# Patient Record
Sex: MALE | Race: WHITE | Employment: FULL TIME
[De-identification: names, ages, dates, MRNs, and addresses within clinical notes are randomized per-mention and may not be internally consistent; named-entity substitution may affect disease eponyms.]

---

## 2023-12-03 ENCOUNTER — APPOINTMENT (OUTPATIENT)
Facility: HOSPITAL | Age: 28
End: 2023-12-03
Payer: COMMERCIAL

## 2023-12-03 ENCOUNTER — HOSPITAL ENCOUNTER (EMERGENCY)
Facility: HOSPITAL | Age: 28
Discharge: HOME | End: 2023-12-03
Attending: EMERGENCY MEDICINE
Payer: COMMERCIAL

## 2023-12-03 VITALS
DIASTOLIC BLOOD PRESSURE: 90 MMHG | HEART RATE: 97 BPM | TEMPERATURE: 98.6 F | OXYGEN SATURATION: 98 % | HEIGHT: 65 IN | SYSTOLIC BLOOD PRESSURE: 119 MMHG | WEIGHT: 160 LBS | RESPIRATION RATE: 16 BRPM | BODY MASS INDEX: 26.66 KG/M2

## 2023-12-03 DIAGNOSIS — R05.1 ACUTE COUGH: ICD-10-CM

## 2023-12-03 DIAGNOSIS — J06.9 ACUTE UPPER RESPIRATORY INFECTION: Primary | ICD-10-CM

## 2023-12-03 LAB
ALBUMIN SERPL-MCNC: 4.3 G/DL (ref 3.4–5)
ALBUMIN/GLOB SERPL: 1.3 (ref 0.8–1.7)
ALP SERPL-CCNC: 79 U/L (ref 45–117)
ALT SERPL-CCNC: 32 U/L (ref 16–61)
ANION GAP SERPL CALC-SCNC: 6 MMOL/L (ref 3–18)
AST SERPL-CCNC: 14 U/L (ref 10–38)
BASOPHILS # BLD: 0.1 K/UL (ref 0–0.1)
BASOPHILS NFR BLD: 0 % (ref 0–2)
BILIRUB SERPL-MCNC: 0.6 MG/DL (ref 0.2–1)
BUN SERPL-MCNC: 15 MG/DL (ref 7–18)
BUN/CREAT SERPL: 13 (ref 12–20)
CALCIUM SERPL-MCNC: 9.1 MG/DL (ref 8.5–10.1)
CHLORIDE SERPL-SCNC: 107 MMOL/L (ref 100–111)
CO2 SERPL-SCNC: 28 MMOL/L (ref 21–32)
CREAT SERPL-MCNC: 1.15 MG/DL (ref 0.6–1.3)
D DIMER PPP FEU-MCNC: 0.27 UG/ML(FEU)
DIFFERENTIAL METHOD BLD: ABNORMAL
EOSINOPHIL # BLD: 0 K/UL (ref 0–0.4)
EOSINOPHIL NFR BLD: 0 % (ref 0–5)
ERYTHROCYTE [DISTWIDTH] IN BLOOD BY AUTOMATED COUNT: 13.2 % (ref 11.6–14.5)
FLUAV AG NPH QL IA: NEGATIVE
FLUBV AG NOSE QL IA: NEGATIVE
GLOBULIN SER CALC-MCNC: 3.2 G/DL (ref 2–4)
GLUCOSE SERPL-MCNC: 122 MG/DL (ref 74–99)
HCT VFR BLD AUTO: 48 % (ref 36–48)
HGB BLD-MCNC: 16.6 G/DL (ref 13–16)
IMM GRANULOCYTES # BLD AUTO: 0.1 K/UL (ref 0–0.04)
IMM GRANULOCYTES NFR BLD AUTO: 1 % (ref 0–0.5)
LYMPHOCYTES # BLD: 0.9 K/UL (ref 0.9–3.6)
LYMPHOCYTES NFR BLD: 6 % (ref 21–52)
MCH RBC QN AUTO: 29.9 PG (ref 24–34)
MCHC RBC AUTO-ENTMCNC: 34.6 G/DL (ref 31–37)
MCV RBC AUTO: 86.3 FL (ref 78–100)
MONOCYTES # BLD: 0.4 K/UL (ref 0.05–1.2)
MONOCYTES NFR BLD: 2 % (ref 3–10)
NEUTS SEG # BLD: 14.7 K/UL (ref 1.8–8)
NEUTS SEG NFR BLD: 91 % (ref 40–73)
NRBC # BLD: 0 K/UL (ref 0–0.01)
NRBC BLD-RTO: 0 PER 100 WBC
PLATELET # BLD AUTO: 400 K/UL (ref 135–420)
PMV BLD AUTO: 9.7 FL (ref 9.2–11.8)
POTASSIUM SERPL-SCNC: 3.8 MMOL/L (ref 3.5–5.5)
PROT SERPL-MCNC: 7.5 G/DL (ref 6.4–8.2)
RBC # BLD AUTO: 5.56 M/UL (ref 4.35–5.65)
SODIUM SERPL-SCNC: 141 MMOL/L (ref 136–145)
WBC # BLD AUTO: 16.1 K/UL (ref 4.6–13.2)

## 2023-12-03 PROCEDURE — 71046 X-RAY EXAM CHEST 2 VIEWS: CPT

## 2023-12-03 PROCEDURE — 96360 HYDRATION IV INFUSION INIT: CPT

## 2023-12-03 PROCEDURE — 80053 COMPREHEN METABOLIC PANEL: CPT

## 2023-12-03 PROCEDURE — 99284 EMERGENCY DEPT VISIT MOD MDM: CPT

## 2023-12-03 PROCEDURE — 2580000003 HC RX 258: Performed by: EMERGENCY MEDICINE

## 2023-12-03 PROCEDURE — 85379 FIBRIN DEGRADATION QUANT: CPT

## 2023-12-03 PROCEDURE — 87804 INFLUENZA ASSAY W/OPTIC: CPT

## 2023-12-03 PROCEDURE — 85025 COMPLETE CBC W/AUTO DIFF WBC: CPT

## 2023-12-03 RX ORDER — 0.9 % SODIUM CHLORIDE 0.9 %
1000 INTRAVENOUS SOLUTION INTRAVENOUS ONCE
Status: COMPLETED | OUTPATIENT
Start: 2023-12-03 | End: 2023-12-03

## 2023-12-03 RX ADMIN — SODIUM CHLORIDE 1000 ML: 9 INJECTION, SOLUTION INTRAVENOUS at 16:35

## 2023-12-03 NOTE — ED NOTES
Medication given per [facility] order. Education regarding medication provided. Tolerated well with no complaints. Instructed patient to utilize the call light if he/she needs anything further. Will continue to monitor.        Jey Santos RN  12/03/23 [id number]

## 2023-12-03 NOTE — ED PROVIDER NOTES
[facility] EMERGENCY DEPT  EMERGENCY DEPARTMENT ENCOUNTER    Patient Name: Dustin Padro  MRN: [id number]  YOB: 1995  Provider: Matthew Shaffer MD  PCP: No primary care provider on file. Time/Date of evaluation: 4:28 PM EST on 12/3/23    History of Presenting Illness     Chief Complaint   Patient presents with    Shortness of Breath    Cough    Fever     Pt had covid 3 weeks ago. Has been taking albuterol. Did given steroids and finished. Given doxy, finished. History Provided by: Patient and Patient's Spouse   History is limited by: Nothing    HISTORY (Narative):   Dustin Pardo is a 34 y.o. male with no contributory PMHx who presents to the emergency department (room 6) by POV C/O shortness of breath onset 3 weeks ago. Associated sxs include cough, fever, rapid heart rate. Patient denies any other sxs or complaints. Patient was diagnosed with COVID 3 weeks ago. 1 week after that, started him on doxycycline for a post COVID-pneumonia, 1 week after that he was started on steroids and nebulizers for a reactive airway component. Today cough is still present and patient feels short of breath. Nursing Notes were all reviewed and agreed with or any disagreements were addressed in the HPI. Past History     PAST MEDICAL HISTORY:  No past medical history on file. PAST SURGICAL HISTORY:  No past surgical history on file. FAMILY HISTORY:  No family history on file. SOCIAL HISTORY:       MEDICATIONS:  No current facility-administered medications for this encounter. No current outpatient medications on file.        ALLERGIES:  No Known Allergies    SOCIAL DETERMINANTS OF HEALTH:  Social Determinants of Health     Tobacco Use: Not on file   Alcohol Use: Not on file   Financial Resource Strain: Not on file   Food Insecurity: Not on file   Transportation Needs: Not on file   Physical Activity: Not on file   Stress: Not on file   Social Connections: Not on file   Intimate Partner

## 2023-12-04 NOTE — ED NOTES
Report given to oncoming RN. Patient information discussed and reviewed per facility protocol. Outstanding orders reviewed (if applicable). No applicable questions at this time. Will sign off from patient.        Miles Simms RN  12/03/23 [id number]

## 2023-12-04 NOTE — DISCHARGE INSTRUCTIONS
Follow-up with your primary care doctor. Return to ED for worsening symptoms or for other concerns. You may use cough drops, tea with honey, salt water gargles to help with symptoms. It is also recommended that you drink plenty of water.